# Patient Record
Sex: FEMALE | Race: BLACK OR AFRICAN AMERICAN | NOT HISPANIC OR LATINO | URBAN - METROPOLITAN AREA
[De-identification: names, ages, dates, MRNs, and addresses within clinical notes are randomized per-mention and may not be internally consistent; named-entity substitution may affect disease eponyms.]

---

## 2019-03-28 ENCOUNTER — OFFICE (OUTPATIENT)
Dept: URBAN - METROPOLITAN AREA CLINIC 45 | Facility: CLINIC | Age: 29
End: 2019-03-28

## 2019-03-28 VITALS
SYSTOLIC BLOOD PRESSURE: 129 MMHG | DIASTOLIC BLOOD PRESSURE: 78 MMHG | WEIGHT: 144 LBS | HEIGHT: 67 IN | TEMPERATURE: 97.7 F | HEART RATE: 79 BPM

## 2019-03-28 DIAGNOSIS — K64.8 HEMORRHOIDS, INTERNAL: ICD-10-CM

## 2019-03-28 PROCEDURE — 46600 DIAGNOSTIC ANOSCOPY SPX: CPT | Performed by: INTERNAL MEDICINE

## 2019-03-28 PROCEDURE — 99203 OFFICE O/P NEW LOW 30 MIN: CPT | Performed by: INTERNAL MEDICINE

## 2019-03-28 RX ORDER — HYDROCORTISONE ACETATE AND PRAMOXINE HYDROCHLORIDE 25; 10 MG/G; MG/G
CREAM TOPICAL
Qty: 30 | Status: COMPLETED
Start: 2019-03-28 | End: 2019-06-04

## 2019-03-28 NOTE — SERVICEHPINOTES
Hx of symptomatic hemorrhoids and anal fissure approx. 1 year. Has occ constipation and tissue wiped rectal bleeding. Currently has grade 3 prolapse. Denies any fh colon ca, abd pain,n/v, fever, chills or wt loss.

## 2019-06-04 ENCOUNTER — OFFICE (OUTPATIENT)
Dept: URBAN - METROPOLITAN AREA CLINIC 45 | Facility: CLINIC | Age: 29
End: 2019-06-04

## 2019-06-04 VITALS
TEMPERATURE: 97.8 F | DIASTOLIC BLOOD PRESSURE: 82 MMHG | HEIGHT: 67 IN | HEART RATE: 95 BPM | WEIGHT: 144 LBS | SYSTOLIC BLOOD PRESSURE: 126 MMHG

## 2019-06-04 DIAGNOSIS — K64.8 HEMORRHOIDS, INTERNAL: ICD-10-CM

## 2019-06-04 PROCEDURE — 99213 OFFICE O/P EST LOW 20 MIN: CPT | Performed by: INTERNAL MEDICINE

## 2019-06-04 NOTE — SERVICEHPINOTES
Hemorrhoids sxs have improved benito bleeding since last visit. Not taking Benefiber as recommended, caused bloating. Still has occ discomfort during a bm but not otherwise.

## 2019-08-20 ENCOUNTER — OFFICE (OUTPATIENT)
Dept: URBAN - METROPOLITAN AREA CLINIC 45 | Facility: CLINIC | Age: 29
End: 2019-08-20

## 2019-08-20 VITALS
TEMPERATURE: 97.6 F | WEIGHT: 147 LBS | HEART RATE: 85 BPM | DIASTOLIC BLOOD PRESSURE: 81 MMHG | HEIGHT: 67 IN | SYSTOLIC BLOOD PRESSURE: 100 MMHG

## 2019-08-20 DIAGNOSIS — K59.00 CONSTIPATION: ICD-10-CM

## 2019-08-20 DIAGNOSIS — R19.4 ALTERED BOWEL FUNCTION: ICD-10-CM

## 2019-08-20 PROCEDURE — 99214 OFFICE O/P EST MOD 30 MIN: CPT | Performed by: INTERNAL MEDICINE

## 2019-08-20 NOTE — SERVICEHPINOTES
We have followed the patient for evaluation and treatment of chronic constipation.    Since last visit, the patient has tried   fiber supplements and Miralax (PEG)  .    Symptoms have   persisted  .    Currently the patient evacuates on average once every   1  day  .    Stools are   pellet-like   in consistency.      Associated symptoms include   a sense of incomplete evacuation  .   Symptoms have been severe enough to cause the patient to   see primary care physician  .      Evaluation thus far has included   none  .   Pt has grade 3 hemorrhoidal issues. no blood in stool but has tissue wiped rectal bleeding. Pt's bms have changed over past few mos. Were very regular and well formed prior.

## 2022-11-16 ENCOUNTER — HOSPITAL ENCOUNTER (INPATIENT)
Dept: HOSPITAL 54 - ER | Age: 32
LOS: 4 days | Discharge: HOME | DRG: 395 | End: 2022-11-20
Attending: INTERNAL MEDICINE | Admitting: NURSE PRACTITIONER
Payer: COMMERCIAL

## 2022-11-16 VITALS — WEIGHT: 155 LBS | HEIGHT: 67 IN | BODY MASS INDEX: 24.33 KG/M2

## 2022-11-16 DIAGNOSIS — K61.1: Primary | ICD-10-CM

## 2022-11-16 DIAGNOSIS — E87.6: ICD-10-CM

## 2022-11-16 DIAGNOSIS — Z20.822: ICD-10-CM

## 2022-11-16 LAB
ALBUMIN SERPL BCP-MCNC: 4 G/DL (ref 3.4–5)
ALP SERPL-CCNC: 72 U/L (ref 46–116)
ALT SERPL W P-5'-P-CCNC: 19 U/L (ref 12–78)
AST SERPL W P-5'-P-CCNC: 16 U/L (ref 15–37)
BASOPHILS # BLD AUTO: 0 K/UL (ref 0–0.2)
BASOPHILS NFR BLD AUTO: 0.3 % (ref 0–2)
BILIRUB DIRECT SERPL-MCNC: 0.2 MG/DL (ref 0–0.2)
BILIRUB SERPL-MCNC: 1.1 MG/DL (ref 0.2–1)
BILIRUB UR QL STRIP: NEGATIVE
BUN SERPL-MCNC: 8 MG/DL (ref 7–18)
CALCIUM SERPL-MCNC: 10.5 MG/DL (ref 8.5–10.1)
CHLORIDE SERPL-SCNC: 104 MMOL/L (ref 98–107)
CO2 SERPL-SCNC: 25 MMOL/L (ref 21–32)
COLOR UR: YELLOW
CREAT SERPL-MCNC: 0.9 MG/DL (ref 0.6–1.3)
EOSINOPHIL NFR BLD AUTO: 0.4 % (ref 0–6)
GLUCOSE SERPL-MCNC: 108 MG/DL (ref 74–106)
GLUCOSE UR STRIP-MCNC: NEGATIVE MG/DL
HCT VFR BLD AUTO: 40 % (ref 33–45)
HGB BLD-MCNC: 13 G/DL (ref 11.5–14.8)
LEUKOCYTE ESTERASE UR QL STRIP: NEGATIVE
LYMPHOCYTES NFR BLD AUTO: 17.2 % (ref 20–44)
LYMPHOCYTES NFR BLD AUTO: 2 K/UL (ref 0.8–4.8)
MCHC RBC AUTO-ENTMCNC: 33 G/DL (ref 31–36)
MCV RBC AUTO: 84 FL (ref 82–100)
MONOCYTES NFR BLD AUTO: 0.9 K/UL (ref 0.1–1.3)
MONOCYTES NFR BLD AUTO: 7.5 % (ref 2–12)
NEUTROPHILS # BLD AUTO: 8.8 K/UL (ref 1.8–8.9)
NEUTROPHILS NFR BLD AUTO: 74.6 % (ref 43–81)
NITRITE UR QL STRIP: NEGATIVE
PH UR STRIP: 6 [PH] (ref 5–8)
PLATELET # BLD AUTO: 320 K/UL (ref 150–450)
POTASSIUM SERPL-SCNC: 4.1 MMOL/L (ref 3.5–5.1)
PROT SERPL-MCNC: 8.1 G/DL (ref 6.4–8.2)
PROT UR QL STRIP: NEGATIVE MG/DL
RBC # BLD AUTO: 4.78 MIL/UL (ref 4–5.2)
RBC #/AREA URNS HPF: (no result) /HPF (ref 0–2)
SODIUM SERPL-SCNC: 137 MMOL/L (ref 136–145)
UROBILINOGEN UR STRIP-MCNC: 0.2 EU/DL
WBC #/AREA URNS HPF: (no result) /HPF (ref 0–3)
WBC NRBC COR # BLD AUTO: 11.9 K/UL (ref 4.3–11)

## 2022-11-16 PROCEDURE — A6403 STERILE GAUZE>16 <= 48 SQ IN: HCPCS

## 2022-11-16 PROCEDURE — C9803 HOPD COVID-19 SPEC COLLECT: HCPCS

## 2022-11-16 PROCEDURE — G0378 HOSPITAL OBSERVATION PER HR: HCPCS

## 2022-11-16 PROCEDURE — A6407 PACKING STRIPS, NON-IMPREG: HCPCS

## 2022-11-16 PROCEDURE — A6253 ABSORPT DRG > 48 SQ IN W/O B: HCPCS

## 2022-11-16 RX ADMIN — PIPERACILLIN SODIUM AND TAZOBACTAM SODIUM SCH MLS/HR: .375; 3 INJECTION, POWDER, LYOPHILIZED, FOR SOLUTION INTRAVENOUS at 23:19

## 2022-11-16 RX ADMIN — IBUPROFEN PRN MG: 400 TABLET, FILM COATED ORAL at 22:39

## 2022-11-16 NOTE — NUR
RN NOTES;



RECEIVED PT FROM ER IN -2 AAOX4 ON RM AIR FLORENCE WELL,NO SIGN SOB/DISTRESS NOTED,NO 
COMPLAINE OF PAIN/DISCOMFORT AT THIS TIME,V/S TAKEN WNL IN RECORDED,IV ACCESS ON RAC 18G 
SL,INTACT AND PATENT,PT WAS ORIENT THE RM IN VERBALLY UNDERSTANDING.SAFETY MEASURE IN 
PLACE,CALL LUGHT WITHIN REACH,WILL CONTINUE TO MONTIOR.

## 2022-11-16 NOTE — NUR
patient came in to the er c/o fever and rectal discomfort x 5 days, sent from 
urgent care due to perianal abscess. On room air, breathing evenly and 
unlabored. Kept comfortable, will continue to monitor accordingly.

## 2022-11-17 VITALS — SYSTOLIC BLOOD PRESSURE: 109 MMHG | DIASTOLIC BLOOD PRESSURE: 66 MMHG

## 2022-11-17 VITALS — DIASTOLIC BLOOD PRESSURE: 60 MMHG | SYSTOLIC BLOOD PRESSURE: 120 MMHG

## 2022-11-17 VITALS — DIASTOLIC BLOOD PRESSURE: 78 MMHG | SYSTOLIC BLOOD PRESSURE: 120 MMHG

## 2022-11-17 LAB
BASOPHILS # BLD AUTO: 0 K/UL (ref 0–0.2)
BASOPHILS NFR BLD AUTO: 0.3 % (ref 0–2)
BUN SERPL-MCNC: 8 MG/DL (ref 7–18)
CALCIUM SERPL-MCNC: 10.2 MG/DL (ref 8.5–10.1)
CHLORIDE SERPL-SCNC: 106 MMOL/L (ref 98–107)
CO2 SERPL-SCNC: 23 MMOL/L (ref 21–32)
CREAT SERPL-MCNC: 0.9 MG/DL (ref 0.6–1.3)
EOSINOPHIL NFR BLD AUTO: 1 % (ref 0–6)
GLUCOSE SERPL-MCNC: 110 MG/DL (ref 74–106)
HCT VFR BLD AUTO: 39 % (ref 33–45)
HGB BLD-MCNC: 13 G/DL (ref 11.5–14.8)
LYMPHOCYTES NFR BLD AUTO: 18.7 % (ref 20–44)
LYMPHOCYTES NFR BLD AUTO: 2 K/UL (ref 0.8–4.8)
MAGNESIUM SERPL-MCNC: 2.1 MG/DL (ref 1.8–2.4)
MCHC RBC AUTO-ENTMCNC: 33 G/DL (ref 31–36)
MCV RBC AUTO: 83 FL (ref 82–100)
MONOCYTES NFR BLD AUTO: 0.9 K/UL (ref 0.1–1.3)
MONOCYTES NFR BLD AUTO: 8.4 % (ref 2–12)
NEUTROPHILS # BLD AUTO: 7.8 K/UL (ref 1.8–8.9)
NEUTROPHILS NFR BLD AUTO: 71.6 % (ref 43–81)
PHOSPHATE SERPL-MCNC: 3.3 MG/DL (ref 2.5–4.9)
PLATELET # BLD AUTO: 301 K/UL (ref 150–450)
POTASSIUM SERPL-SCNC: 4 MMOL/L (ref 3.5–5.1)
RBC # BLD AUTO: 4.76 MIL/UL (ref 4–5.2)
SODIUM SERPL-SCNC: 139 MMOL/L (ref 136–145)
WBC NRBC COR # BLD AUTO: 10.9 K/UL (ref 4.3–11)

## 2022-11-17 RX ADMIN — IBUPROFEN PRN MG: 400 TABLET, FILM COATED ORAL at 08:11

## 2022-11-17 RX ADMIN — PIPERACILLIN SODIUM AND TAZOBACTAM SODIUM SCH MLS/HR: .375; 3 INJECTION, POWDER, LYOPHILIZED, FOR SOLUTION INTRAVENOUS at 18:17

## 2022-11-17 RX ADMIN — PIPERACILLIN SODIUM AND TAZOBACTAM SODIUM SCH MLS/HR: .375; 3 INJECTION, POWDER, LYOPHILIZED, FOR SOLUTION INTRAVENOUS at 05:08

## 2022-11-17 RX ADMIN — TRAMADOL HYDROCHLORIDE PRN MG: 50 TABLET, FILM COATED ORAL at 23:34

## 2022-11-17 RX ADMIN — TRAMADOL HYDROCHLORIDE PRN MG: 50 TABLET, FILM COATED ORAL at 04:01

## 2022-11-17 RX ADMIN — PIPERACILLIN SODIUM AND TAZOBACTAM SODIUM SCH MLS/HR: .375; 3 INJECTION, POWDER, LYOPHILIZED, FOR SOLUTION INTRAVENOUS at 12:27

## 2022-11-17 RX ADMIN — IBUPROFEN PRN MG: 400 TABLET, FILM COATED ORAL at 16:37

## 2022-11-17 RX ADMIN — PIPERACILLIN SODIUM AND TAZOBACTAM SODIUM SCH MLS/HR: .375; 3 INJECTION, POWDER, LYOPHILIZED, FOR SOLUTION INTRAVENOUS at 23:21

## 2022-11-17 RX ADMIN — IBUPROFEN PRN MG: 400 TABLET, FILM COATED ORAL at 20:38

## 2022-11-17 NOTE — NUR
ms rn

received on bed, awake,alert,oriented x4,not in any form of distress, respirations even and 
unlabored,no sob noted, lungs are clear,abdomen soft,positive bowel spounds,denies pain at 
this time,all needs attended.

## 2022-11-17 NOTE — NUR
MOTRIN MED NOT YET DUE FOR ADMIN. SHOULD BE ADMINISTERED EVERY 8 HOURS FOR PAIN. RETURNED 
BACK TO [facility].

## 2022-11-17 NOTE — NUR
Naif is laying in bed quietly, appears to be asleep with even, unlabored breaths. No further outbursts at this time.   ms olinda Coronado came to evaluate patient, decided to to it in IR thru ultrasound guided procedure, 
patient is aware.

## 2022-11-17 NOTE — NUR
RECEIVED PATIENT LYING AWAKE IN BED. ALERT AND ORIENTED X4. BREATHING ON ROOM AIR, EVEN AND 
UNLABORED. NO SIGNS OF RESPIRATORY DISTRESS. WITH IV ACCESS AT RIGHT AC #18, INTACT AND 
PATENT. PROCEDURE FOR INCISION AND DRAINAGE WAS POSTPONED TOMORROW 11/18. PATIENT IS 
COMPLAINING OF PAIN, RATED SCALE 8/10. GIVEN MORPHINE 4MG. SAFETY PRECAUTIONS INITIATED. 
SIDE RAILS UPX2, BED LOWERED AND LOCKED. CALL LIGHT WITHIN REACH. WILL CONTINUE TO MONITOR.

## 2022-11-17 NOTE — NUR
RN CLOSING NOTES;



PT IN BED  AAOX4 ON RM AIR FLORENCE WELL,NO SIGN SOB/DISTRESS NOTED,PT COMPLAINED OF PAIN AROUND 
THE STOMACH DURING SHIFT PRN MEDS WAS GIVEN,DUE MEDS GIVEN AS ORDER,ALL NEEDS ATTENDED,IV 
ACCESS ON RAC 18G SL,INTACT AND PATENT,SAFETY MEASURE IN PLACE,CALL LUGHT WITHIN REACH,WILL 
INDORSED TO NEXT SHIFT.

## 2022-11-18 VITALS — SYSTOLIC BLOOD PRESSURE: 110 MMHG | DIASTOLIC BLOOD PRESSURE: 63 MMHG

## 2022-11-18 VITALS — DIASTOLIC BLOOD PRESSURE: 63 MMHG | SYSTOLIC BLOOD PRESSURE: 111 MMHG

## 2022-11-18 LAB
BASOPHILS # BLD AUTO: 0 K/UL (ref 0–0.2)
BASOPHILS NFR BLD AUTO: 0.2 % (ref 0–2)
BUN SERPL-MCNC: 9 MG/DL (ref 7–18)
CALCIUM SERPL-MCNC: 9.9 MG/DL (ref 8.5–10.1)
CHLORIDE SERPL-SCNC: 105 MMOL/L (ref 98–107)
CO2 SERPL-SCNC: 22 MMOL/L (ref 21–32)
CREAT SERPL-MCNC: 0.8 MG/DL (ref 0.6–1.3)
EOSINOPHIL NFR BLD AUTO: 0.3 % (ref 0–6)
GLUCOSE SERPL-MCNC: 97 MG/DL (ref 74–106)
HCT VFR BLD AUTO: 36 % (ref 33–45)
HGB BLD-MCNC: 11.9 G/DL (ref 11.5–14.8)
LYMPHOCYTES NFR BLD AUTO: 1.4 K/UL (ref 0.8–4.8)
LYMPHOCYTES NFR BLD AUTO: 11.4 % (ref 20–44)
MAGNESIUM SERPL-MCNC: 2 MG/DL (ref 1.8–2.4)
MCHC RBC AUTO-ENTMCNC: 34 G/DL (ref 31–36)
MCV RBC AUTO: 83 FL (ref 82–100)
MONOCYTES NFR BLD AUTO: 1.2 K/UL (ref 0.1–1.3)
MONOCYTES NFR BLD AUTO: 9.7 % (ref 2–12)
NEUTROPHILS # BLD AUTO: 9.6 K/UL (ref 1.8–8.9)
NEUTROPHILS NFR BLD AUTO: 78.4 % (ref 43–81)
PHOSPHATE SERPL-MCNC: 3.5 MG/DL (ref 2.5–4.9)
PLATELET # BLD AUTO: 314 K/UL (ref 150–450)
POTASSIUM SERPL-SCNC: 3.9 MMOL/L (ref 3.5–5.1)
RBC # BLD AUTO: 4.31 MIL/UL (ref 4–5.2)
SODIUM SERPL-SCNC: 138 MMOL/L (ref 136–145)
WBC NRBC COR # BLD AUTO: 12.2 K/UL (ref 4.3–11)

## 2022-11-18 RX ADMIN — IBUPROFEN PRN MG: 400 TABLET, FILM COATED ORAL at 17:57

## 2022-11-18 RX ADMIN — PIPERACILLIN SODIUM AND TAZOBACTAM SODIUM SCH MLS/HR: .375; 3 INJECTION, POWDER, LYOPHILIZED, FOR SOLUTION INTRAVENOUS at 06:07

## 2022-11-18 RX ADMIN — TRAMADOL HYDROCHLORIDE PRN MG: 50 TABLET, FILM COATED ORAL at 10:23

## 2022-11-18 RX ADMIN — PIPERACILLIN SODIUM AND TAZOBACTAM SODIUM SCH MLS/HR: .375; 3 INJECTION, POWDER, LYOPHILIZED, FOR SOLUTION INTRAVENOUS at 23:55

## 2022-11-18 RX ADMIN — PIPERACILLIN SODIUM AND TAZOBACTAM SODIUM SCH MLS/HR: .375; 3 INJECTION, POWDER, LYOPHILIZED, FOR SOLUTION INTRAVENOUS at 17:58

## 2022-11-18 RX ADMIN — IBUPROFEN PRN MG: 400 TABLET, FILM COATED ORAL at 03:59

## 2022-11-18 RX ADMIN — PIPERACILLIN SODIUM AND TAZOBACTAM SODIUM SCH MLS/HR: .375; 3 INJECTION, POWDER, LYOPHILIZED, FOR SOLUTION INTRAVENOUS at 13:23

## 2022-11-18 NOTE — NUR
ms rn

new iv site inserted at right forearm w/ good venous return, patient appears calm, pain 
free, will endorse to night shift for christian.

## 2022-11-18 NOTE — NUR
MS RN CLOSING NOTES



PATIENT IS LYING ASLEEP IN BED. ALERT AND ORIENTED X4. BREATHING ON ROOM AIR, EVEN AND 
UNLABORED. NO SIGNS OF RESPIRATORY DISTRESS. WITH IV ACCESS AT RIGHT AC #18, INTACT AND 
PATENT. SAFETY PRECAUTIONS INITIATED. SIDE RAILS UPX2, BED LOWERED AND LOCKED. CALL LIGHT 
WITHIN REACH. WILL ENDORSE TO DAY SHIFT RN FOR ASH.

## 2022-11-18 NOTE — NUR
RN NOTES ( MODERATED SEDATION )

MODERATED SEDATION STARTED , PLEASE SEE MODERATED SEDATION FLOW SHEET IN THE CHART.

## 2022-11-18 NOTE — NUR
MS RN OPENING NOTES:



RECEIVED PATIENT AWAKE IN BED, BED IN LOW POSITION CALL LIGHTS WITHIN REACH, NO COMPLAIN OF 
PAIN AND DISCOMFORT AT THIS TIME ON ROOM AIR SATURATING WELL, PATIENT IS A/OX4 ABLE TO MAKE 
NEEDS KNOWN AND AMBULATORY, PATIENT KEPT CLEAN AND DRY ALL NEEDS MET WILL CONTINUE TO 
MONITOR.

## 2022-11-18 NOTE — NUR
RN NOTES ( CT DEP.) 

PT TOLERATED THE PROCEDURE  WELL, VSS STABLE, PLEASE SEE MODERATE SEDATION FLOW SHEET IN THE 
CHART. NO COMPLICATION NOTED, PT A/Ox4.

## 2022-11-18 NOTE — NUR
ms rn

came back from procedure,awake,alert,oriented x4,not in any  form of distress, denies pain 
at this time, will monitor patient,all needs attended.

## 2022-11-18 NOTE — NUR
ms rn

received patient on bed, awake,alert,oriented x4,not in any form of distress, respirations 
even and unlabored,no sob noted, denies pain at this time,all needs attended.

## 2022-11-19 VITALS — DIASTOLIC BLOOD PRESSURE: 69 MMHG | SYSTOLIC BLOOD PRESSURE: 112 MMHG

## 2022-11-19 VITALS — DIASTOLIC BLOOD PRESSURE: 76 MMHG | SYSTOLIC BLOOD PRESSURE: 114 MMHG

## 2022-11-19 VITALS — DIASTOLIC BLOOD PRESSURE: 66 MMHG | SYSTOLIC BLOOD PRESSURE: 110 MMHG

## 2022-11-19 LAB
BASOPHILS # BLD AUTO: 0 K/UL (ref 0–0.2)
BASOPHILS NFR BLD AUTO: 0.2 % (ref 0–2)
BUN SERPL-MCNC: 9 MG/DL (ref 7–18)
CALCIUM SERPL-MCNC: 9.8 MG/DL (ref 8.5–10.1)
CHLORIDE SERPL-SCNC: 103 MMOL/L (ref 98–107)
CO2 SERPL-SCNC: 27 MMOL/L (ref 21–32)
CREAT SERPL-MCNC: 1 MG/DL (ref 0.6–1.3)
EOSINOPHIL NFR BLD AUTO: 0.4 % (ref 0–6)
GLUCOSE SERPL-MCNC: 149 MG/DL (ref 74–106)
HCT VFR BLD AUTO: 35 % (ref 33–45)
HGB BLD-MCNC: 11.6 G/DL (ref 11.5–14.8)
LYMPHOCYTES NFR BLD AUTO: 1.4 K/UL (ref 0.8–4.8)
LYMPHOCYTES NFR BLD AUTO: 12 % (ref 20–44)
MAGNESIUM SERPL-MCNC: 2.1 MG/DL (ref 1.8–2.4)
MCHC RBC AUTO-ENTMCNC: 33 G/DL (ref 31–36)
MCV RBC AUTO: 82 FL (ref 82–100)
MONOCYTES NFR BLD AUTO: 0.9 K/UL (ref 0.1–1.3)
MONOCYTES NFR BLD AUTO: 8.1 % (ref 2–12)
NEUTROPHILS # BLD AUTO: 9.3 K/UL (ref 1.8–8.9)
NEUTROPHILS NFR BLD AUTO: 79.3 % (ref 43–81)
PHOSPHATE SERPL-MCNC: 2.7 MG/DL (ref 2.5–4.9)
PLATELET # BLD AUTO: 310 K/UL (ref 150–450)
POTASSIUM SERPL-SCNC: 3.4 MMOL/L (ref 3.5–5.1)
RBC # BLD AUTO: 4.27 MIL/UL (ref 4–5.2)
SODIUM SERPL-SCNC: 137 MMOL/L (ref 136–145)
WBC NRBC COR # BLD AUTO: 11.7 K/UL (ref 4.3–11)

## 2022-11-19 RX ADMIN — IBUPROFEN PRN MG: 400 TABLET, FILM COATED ORAL at 03:51

## 2022-11-19 RX ADMIN — PIPERACILLIN SODIUM AND TAZOBACTAM SODIUM SCH MLS/HR: .375; 3 INJECTION, POWDER, LYOPHILIZED, FOR SOLUTION INTRAVENOUS at 05:53

## 2022-11-19 RX ADMIN — PIPERACILLIN SODIUM AND TAZOBACTAM SODIUM SCH MLS/HR: .375; 3 INJECTION, POWDER, LYOPHILIZED, FOR SOLUTION INTRAVENOUS at 12:16

## 2022-11-19 RX ADMIN — PIPERACILLIN SODIUM AND TAZOBACTAM SODIUM SCH MLS/HR: .375; 3 INJECTION, POWDER, LYOPHILIZED, FOR SOLUTION INTRAVENOUS at 18:20

## 2022-11-19 RX ADMIN — IBUPROFEN PRN MG: 400 TABLET, FILM COATED ORAL at 13:15

## 2022-11-19 RX ADMIN — IBUPROFEN PRN MG: 400 TABLET, FILM COATED ORAL at 21:16

## 2022-11-19 NOTE — NUR
MS RN OPENING NOTES:



RECEIVED PATIENT AWAKE IN BED, BED IN LOW  POSITION CALL LIGHTS WITHIN REACH, NO COMPLAIN OF 
PAIN AND DISCOMFORT AT THIS TIME, ON ROOM AIR SATURATING WELL, PATIENT IS A/OX4 ABLE TO MAKE 
NEEDS KNOWN, WITH LEFT  RECTAL ABSCESS DRAINAGE NO BLEEDING WAS OBSERVED, PATIENT KEPT CLEAN 
AND DRY ALL NEEDS MET WILL CONTINUE TO MONITOR.

## 2022-11-19 NOTE — NUR
MS RN CLOSING NOTES;



RECEIVED PATIENT AWAKE IN BED, BED IN LOW POSITION, CALL LIGHTS WITHIN REACH, NO COMPLAIN OF 
PAIN AND DISCOMFORT AT THIS TIME, ON ROOM AIR SATURATING WELL, PATIENT IS A/OX4 ABLE TO MAKE 
NEEDS KNOWN, AMBULATORY, WITH KARLA RECTAL ABSCESS DRAINAGE AT 30CC OUTPUT, V/S ARE WITHIN 
NORMAL LIMIT, PATIENT KEPT CLEAN AND DRY ALL NEEDS MET ENDORSE TO INCOMING SHIFT.

## 2022-11-20 VITALS — DIASTOLIC BLOOD PRESSURE: 80 MMHG | SYSTOLIC BLOOD PRESSURE: 123 MMHG

## 2022-11-20 LAB
BASOPHILS # BLD AUTO: 0 K/UL (ref 0–0.2)
BASOPHILS NFR BLD AUTO: 0.3 % (ref 0–2)
BUN SERPL-MCNC: 10 MG/DL (ref 7–18)
CALCIUM SERPL-MCNC: 10.6 MG/DL (ref 8.5–10.1)
CO2 SERPL-SCNC: 29 MMOL/L (ref 21–32)
CREAT SERPL-MCNC: 1 MG/DL (ref 0.6–1.3)
EOSINOPHIL NFR BLD AUTO: 1.4 % (ref 0–6)
GLUCOSE SERPL-MCNC: 98 MG/DL (ref 74–106)
HCT VFR BLD AUTO: 39 % (ref 33–45)
HGB BLD-MCNC: 13 G/DL (ref 11.5–14.8)
LYMPHOCYTES NFR BLD AUTO: 2 K/UL (ref 0.8–4.8)
LYMPHOCYTES NFR BLD AUTO: 24.4 % (ref 20–44)
MAGNESIUM SERPL-MCNC: 2 MG/DL (ref 1.8–2.4)
MCHC RBC AUTO-ENTMCNC: 34 G/DL (ref 31–36)
MCV RBC AUTO: 82 FL (ref 82–100)
MONOCYTES NFR BLD AUTO: 0.6 K/UL (ref 0.1–1.3)
MONOCYTES NFR BLD AUTO: 7.7 % (ref 2–12)
NEUTROPHILS # BLD AUTO: 5.5 K/UL (ref 1.8–8.9)
NEUTROPHILS NFR BLD AUTO: 66.2 % (ref 43–81)
PHOSPHATE SERPL-MCNC: 3.5 MG/DL (ref 2.5–4.9)
PLATELET # BLD AUTO: 353 K/UL (ref 150–450)
POTASSIUM SERPL-SCNC: 3.8 MMOL/L (ref 3.5–5.1)
RBC # BLD AUTO: 4.73 MIL/UL (ref 4–5.2)
SODIUM SERPL-SCNC: 144 MMOL/L (ref 136–145)
WBC NRBC COR # BLD AUTO: 8.3 K/UL (ref 4.3–11)

## 2022-11-20 PROCEDURE — 0D9P30Z DRAINAGE OF RECTUM WITH DRAINAGE DEVICE, PERCUTANEOUS APPROACH: ICD-10-PCS

## 2022-11-20 RX ADMIN — PIPERACILLIN SODIUM AND TAZOBACTAM SODIUM SCH MLS/HR: .375; 3 INJECTION, POWDER, LYOPHILIZED, FOR SOLUTION INTRAVENOUS at 13:10

## 2022-11-20 RX ADMIN — PIPERACILLIN SODIUM AND TAZOBACTAM SODIUM SCH MLS/HR: .375; 3 INJECTION, POWDER, LYOPHILIZED, FOR SOLUTION INTRAVENOUS at 00:01

## 2022-11-20 RX ADMIN — PIPERACILLIN SODIUM AND TAZOBACTAM SODIUM SCH MLS/HR: .375; 3 INJECTION, POWDER, LYOPHILIZED, FOR SOLUTION INTRAVENOUS at 06:10

## 2022-11-20 NOTE — NUR
RN DISCHARGE NOTE





PATIENT DISCHARGED FROM UNIT @ 1540 VIA PRIVATE CAR. IV TAKEN OUT. ALL BELONGINGS ACCOUNTED 
FOR; INVENTORY LIST SIGNED.

## 2022-11-20 NOTE — NUR
RN CLOSING NOTES:



PATIENT SLEEP IN BED COMFORTABLY AROUSABLE TO VERBAL STIMULI, A/OX4, AMBULATORY,  ABLE TO 
MAKE NEEDS KNOWN, PATIENT KEPT CLEAN AND DRY ALL NEEDS MET ENDORSE TO INCOMING SHIFT.

## 2022-11-20 NOTE — NUR
RN OPENING NOTE



PATIENT RECEIVED IN BED AND AWAKE. A/O X4 AND ABLE TO VERBALIZE NEEDS. IV ACCESS TO RAC 
INTACT AND PATENT. NO C/O PAIN OR S/SX OF DISTRESS OBSERVED. SAFETY MEASURES IN TACT WITH 
BED LOW AND LOCKED AND SIDERAIL UP X2. CALL LIGHT WITHIN REACH. WILL CONT TO MONITOR.